# Patient Record
Sex: FEMALE | Race: WHITE
[De-identification: names, ages, dates, MRNs, and addresses within clinical notes are randomized per-mention and may not be internally consistent; named-entity substitution may affect disease eponyms.]

---

## 2018-01-05 ENCOUNTER — HOSPITAL ENCOUNTER (INPATIENT)
Dept: HOSPITAL 54 - ER | Age: 83
LOS: 7 days | Discharge: HOME | DRG: 139 | End: 2018-01-12
Attending: NURSE PRACTITIONER | Admitting: NURSE PRACTITIONER
Payer: COMMERCIAL

## 2018-01-05 VITALS — SYSTOLIC BLOOD PRESSURE: 126 MMHG | DIASTOLIC BLOOD PRESSURE: 60 MMHG

## 2018-01-05 VITALS — WEIGHT: 98 LBS | BODY MASS INDEX: 16.73 KG/M2 | HEIGHT: 64 IN

## 2018-01-05 DIAGNOSIS — F02.80: ICD-10-CM

## 2018-01-05 DIAGNOSIS — L97.529: ICD-10-CM

## 2018-01-05 DIAGNOSIS — D62: ICD-10-CM

## 2018-01-05 DIAGNOSIS — R23.4: ICD-10-CM

## 2018-01-05 DIAGNOSIS — I70.0: ICD-10-CM

## 2018-01-05 DIAGNOSIS — Z96.653: ICD-10-CM

## 2018-01-05 DIAGNOSIS — E11.621: ICD-10-CM

## 2018-01-05 DIAGNOSIS — B96.20: ICD-10-CM

## 2018-01-05 DIAGNOSIS — G93.40: ICD-10-CM

## 2018-01-05 DIAGNOSIS — Z95.1: ICD-10-CM

## 2018-01-05 DIAGNOSIS — D50.9: ICD-10-CM

## 2018-01-05 DIAGNOSIS — Z79.899: ICD-10-CM

## 2018-01-05 DIAGNOSIS — J15.9: Primary | ICD-10-CM

## 2018-01-05 DIAGNOSIS — I25.10: ICD-10-CM

## 2018-01-05 DIAGNOSIS — R13.10: ICD-10-CM

## 2018-01-05 DIAGNOSIS — K59.00: ICD-10-CM

## 2018-01-05 DIAGNOSIS — J06.9: ICD-10-CM

## 2018-01-05 DIAGNOSIS — G30.9: ICD-10-CM

## 2018-01-05 DIAGNOSIS — N39.0: ICD-10-CM

## 2018-01-05 DIAGNOSIS — I10: ICD-10-CM

## 2018-01-05 DIAGNOSIS — F41.9: ICD-10-CM

## 2018-01-05 LAB
ALBUMIN SERPL BCP-MCNC: 2.8 G/DL (ref 3.4–5)
ALP SERPL-CCNC: 66 U/L (ref 46–116)
ALT SERPL W P-5'-P-CCNC: 13 U/L (ref 12–78)
APTT PPP: 24 SEC (ref 23–34)
AST SERPL W P-5'-P-CCNC: 16 U/L (ref 15–37)
BILIRUB DIRECT SERPL-MCNC: 0.1 MG/DL (ref 0–0.2)
BILIRUB SERPL-MCNC: 0.3 MG/DL (ref 0.2–1)
BUN SERPL-MCNC: 46 MG/DL (ref 7–18)
CALCIUM SERPL-MCNC: 8.5 MG/DL (ref 8.5–10.1)
CHLORIDE SERPL-SCNC: 109 MMOL/L (ref 98–107)
CO2 SERPL-SCNC: 29 MMOL/L (ref 21–32)
CREAT SERPL-MCNC: 1.3 MG/DL (ref 0.6–1.3)
EOSINOPHIL NFR BLD MANUAL: 1 % (ref 0–4)
GLUCOSE SERPL-MCNC: 194 MG/DL (ref 74–106)
HCT VFR BLD AUTO: 17 % (ref 33–45)
HCT VFR BLD AUTO: 18 % (ref 33–45)
HCT VFR BLD AUTO: 23 % (ref 33–45)
HGB BLD-MCNC: 5 G/DL (ref 11.5–14.8)
HGB BLD-MCNC: 5.5 G/DL (ref 11.5–14.8)
HGB BLD-MCNC: 6.9 G/DL (ref 11.5–14.8)
INR PPP: 1.15 (ref 0.87–1.13)
LYMPHOCYTES NFR BLD MANUAL: 16 % (ref 16–48)
MCH RBC QN AUTO: 16 PG (ref 26–33)
MCHC RBC AUTO-ENTMCNC: 30 G/DL (ref 31–36)
MCV RBC AUTO: 54 FL (ref 82–100)
MONOCYTES NFR BLD MANUAL: 3 % (ref 0–11)
NEUTS BAND NFR BLD MANUAL: 6 % (ref 0–5)
NEUTS SEG NFR BLD MANUAL: 74 % (ref 42–76)
PLATELET # BLD AUTO: 282 /CMM (ref 150–450)
POTASSIUM SERPL-SCNC: 4.2 MMOL/L (ref 3.5–5.1)
PROT SERPL-MCNC: 7 G/DL (ref 6.4–8.2)
PROTHROMBIN TIME: 12 SECS (ref 9.5–12.7)
RBC # BLD AUTO: 3.07 MIL/UL (ref 4–5.2)
RDW COEFFICIENT OF VARIATION: 17.9 (ref 11.5–15)
SODIUM SERPL-SCNC: 141 MMOL/L (ref 136–145)
TROPONIN I SERPL-MCNC: 0.04 NG/ML (ref 0–0.06)
WBC NRBC COR # BLD AUTO: 2.8 K/UL (ref 4.3–11)

## 2018-01-05 PROCEDURE — A4606 OXYGEN PROBE USED W OXIMETER: HCPCS

## 2018-01-05 PROCEDURE — A6248 HYDROGEL DRSG GEL FILLER: HCPCS

## 2018-01-05 PROCEDURE — Z7610: HCPCS

## 2018-01-05 PROCEDURE — 30233N1 TRANSFUSION OF NONAUTOLOGOUS RED BLOOD CELLS INTO PERIPHERAL VEIN, PERCUTANEOUS APPROACH: ICD-10-PCS

## 2018-01-05 RX ADMIN — LORAZEPAM PRN MG: 2 INJECTION INTRAMUSCULAR; INTRAVENOUS at 21:16

## 2018-01-05 NOTE — NUR
MS RN NOTES

RECEIVED CRITICALLY [facility] RESULT FOR HGB 6.9, DR PEPPER NOTIFIED,NEW ORDER RECEIVED TO 
TRANSFUSE 1 UNIT PRBC.PT AWAKE,NO SOB NOTED,IN BED RESTING COMFORTABLY.ATTENDED ALL NEEDS. 
NOTIFIED LAB. INFORMED CONSENT FOR BLOOD TRANSFUSION OBTAINED OVER THE PHONE WITH 
RESPONSIBLE PARTY, WITNESSED BY 2 LICENSED NURSES

## 2018-01-05 NOTE — NUR
MS RN NOTES

PT VERY RESTLESS,TRYING TO GET OUT OF BED,DR ESCALONA AT THE BEDSIDE, ASSESSED THE PT, NEW 
ORDER RECEIVED FOR ATIVAN 1MG Q12H PRN. NOTED AND CARRIED OUT. MEDICATION ADMINISTERED AS 
ORDERED WILL CONTINUE TO MONITOR ACCORDINGLY

## 2018-01-05 NOTE — NUR
NAE DT NEAR SYNCOPAL EPISODE AND SOB WHILE PATIENT WAS ON HER 'S 
CREMATION SERVICE. PATIENT IS AWAKE HOWEVER CONFUSED. PATIENT SATING WELL ON 
ROOM AIR, AFEBRILE. GOWNED PT AND PLACED ON TELE MONITOR. VSS

## 2018-01-05 NOTE — NUR
MS RN OPENING NOTES

RECEIVED PT AWAKE,CONFUSED, RESTLESS, TRYING TO PULL IV ACCESS, REMOVING CLOTHES,TRYING TO 
GET OUT OF BED. ON ROOM AIR, NO SOB,NO APPARENT DISTRESS NOTED. NO S/SX OF PAIN OR 
DISCOMFORT NOTED AT THIS TIME.FAMILY AT BEDSIDE. KEPT CLEAN AND COMFORTABLE.IV SITE INTACT, 
PATENT.S/P BLOOD TRANSFUSION IN ER, NO A/R NOTED.BED LOCKED IN LOWEST POSITION.WILL CONTINUE 
TO MONITOR ACCORDINGLY.

## 2018-01-06 VITALS — SYSTOLIC BLOOD PRESSURE: 134 MMHG | DIASTOLIC BLOOD PRESSURE: 70 MMHG

## 2018-01-06 VITALS — SYSTOLIC BLOOD PRESSURE: 136 MMHG | DIASTOLIC BLOOD PRESSURE: 76 MMHG

## 2018-01-06 VITALS — DIASTOLIC BLOOD PRESSURE: 50 MMHG | SYSTOLIC BLOOD PRESSURE: 100 MMHG

## 2018-01-06 VITALS — DIASTOLIC BLOOD PRESSURE: 80 MMHG | SYSTOLIC BLOOD PRESSURE: 130 MMHG

## 2018-01-06 VITALS — SYSTOLIC BLOOD PRESSURE: 110 MMHG | DIASTOLIC BLOOD PRESSURE: 58 MMHG

## 2018-01-06 VITALS — DIASTOLIC BLOOD PRESSURE: 74 MMHG | SYSTOLIC BLOOD PRESSURE: 138 MMHG

## 2018-01-06 VITALS — DIASTOLIC BLOOD PRESSURE: 75 MMHG | SYSTOLIC BLOOD PRESSURE: 146 MMHG

## 2018-01-06 VITALS — DIASTOLIC BLOOD PRESSURE: 53 MMHG | SYSTOLIC BLOOD PRESSURE: 112 MMHG

## 2018-01-06 VITALS — DIASTOLIC BLOOD PRESSURE: 78 MMHG | SYSTOLIC BLOOD PRESSURE: 132 MMHG

## 2018-01-06 VITALS — SYSTOLIC BLOOD PRESSURE: 129 MMHG | DIASTOLIC BLOOD PRESSURE: 79 MMHG

## 2018-01-06 VITALS — SYSTOLIC BLOOD PRESSURE: 138 MMHG | DIASTOLIC BLOOD PRESSURE: 63 MMHG

## 2018-01-06 LAB
ALBUMIN SERPL BCP-MCNC: 2.6 G/DL (ref 3.4–5)
ALP SERPL-CCNC: 64 U/L (ref 46–116)
ALT SERPL W P-5'-P-CCNC: 16 U/L (ref 12–78)
AST SERPL W P-5'-P-CCNC: 18 U/L (ref 15–37)
BASOPHILS # BLD AUTO: 0 /CMM (ref 0–0.2)
BASOPHILS NFR BLD AUTO: 0 % (ref 0–2)
BILIRUB SERPL-MCNC: 0.5 MG/DL (ref 0.2–1)
BUN SERPL-MCNC: 30 MG/DL (ref 7–18)
CALCIUM SERPL-MCNC: 8.5 MG/DL (ref 8.5–10.1)
CHLORIDE SERPL-SCNC: 112 MMOL/L (ref 98–107)
CHOLEST SERPL-MCNC: 117 MG/DL (ref ?–200)
CO2 SERPL-SCNC: 26 MMOL/L (ref 21–32)
CREAT SERPL-MCNC: 0.9 MG/DL (ref 0.6–1.3)
EOSINOPHIL # BLD AUTO: 0.2 /CMM (ref 0–0.7)
EOSINOPHIL NFR BLD AUTO: 2.3 % (ref 0–6)
GLUCOSE SERPL-MCNC: 98 MG/DL (ref 74–106)
HCT VFR BLD AUTO: 28 % (ref 33–45)
HCT VFR BLD AUTO: 28 % (ref 33–45)
HDLC SERPL-MCNC: 42 MG/DL (ref 40–60)
HGB BLD-MCNC: 8.8 G/DL (ref 11.5–14.8)
HGB BLD-MCNC: 8.9 G/DL (ref 11.5–14.8)
LDLC SERPL DIRECT ASSAY-MCNC: 63 MG/DL (ref 0–99)
LIPASE SERPL-CCNC: 68 U/L (ref 73–393)
LYMPHOCYTES NFR BLD AUTO: 1 /CMM (ref 0.8–4.8)
LYMPHOCYTES NFR BLD AUTO: 12.6 % (ref 20–44)
MAGNESIUM SERPL-MCNC: 2.1 MG/DL (ref 1.8–2.4)
MCH RBC QN AUTO: 21 PG (ref 26–33)
MCHC RBC AUTO-ENTMCNC: 32 G/DL (ref 31–36)
MCV RBC AUTO: 67 FL (ref 82–100)
MONOCYTES NFR BLD AUTO: 0.3 /CMM (ref 0.1–1.3)
MONOCYTES NFR BLD AUTO: 3.2 % (ref 2–12)
NEUTROPHILS # BLD AUTO: 6.5 /CMM (ref 1.8–8.9)
NEUTROPHILS NFR BLD AUTO: 81.9 % (ref 43–81)
PHOSPHATE SERPL-MCNC: 3.1 MG/DL (ref 2.5–4.9)
PLATELET # BLD AUTO: 241 /CMM (ref 150–450)
POTASSIUM SERPL-SCNC: 4 MMOL/L (ref 3.5–5.1)
PROT SERPL-MCNC: 6.6 G/DL (ref 6.4–8.2)
RBC # BLD AUTO: 4.17 MIL/UL (ref 4–5.2)
RDW COEFFICIENT OF VARIATION: 33.6 (ref 11.5–15)
SODIUM SERPL-SCNC: 146 MMOL/L (ref 136–145)
TRIGL SERPL-MCNC: 69 MG/DL (ref 30–150)
TSH SERPL DL<=0.005 MIU/L-ACNC: 1.15 UIU/ML (ref 0.36–3.74)
WBC NRBC COR # BLD AUTO: 8 K/UL (ref 4.3–11)

## 2018-01-06 RX ADMIN — LORAZEPAM PRN MG: 2 INJECTION INTRAMUSCULAR; INTRAVENOUS at 14:09

## 2018-01-06 RX ADMIN — SODIUM CHLORIDE PRN MLS/HR: 4.5 INJECTION, SOLUTION INTRAVENOUS at 23:00

## 2018-01-06 NOTE — NUR
MS RN NOTES

BLOOD TRANSFUSION ONGOING,NO ADVERSE REACTIONS NOTED,PT AFEBRILE,NO RESPIRATORY DISTRESS, NO 
S/SX OF PAIN OR DISCOMFORT NOTED. AWAKE.MONITORED CLOSELY

## 2018-01-06 NOTE — NUR
MS RN NOTES

BLOOD TRANSFUSION INITIATED, VERIFIED BY 2 LICENSED NURSES. PT AWAKE,ON ROOM AIR, AFEBRILE, 
NO ADVERSE REACTION NOTED. TOLERATED WELL NO SOB NOTED. IV SITE INTACT.KEPT CLEAN AND 
DRY.MONITORED CLOSELY.ATTENDED ALL NEEDS.

## 2018-01-06 NOTE — NUR
RN NOTES

PT IS RESTING IN BED COMFORTABLY. PT ON RA, RESPIRATIONS ARE EVEN AND UNLABORED. IV ON LAC 
INTACT AND RUNNING 1/2 NS @ 40ML/HR. LAXATIVE GIVEN AS ORDERED FOR ACUTE CONSTIPATION. PT 
PROVIDED WITH SKIN CARE, KEPT CLEAN AND DRY AND REPOSITIONED Q2HRS. H/H IS 8.8 & 28, 
CONSISTENT WITH EARLIER LABS. SAFETY MEASURES ARE IN PLACE, CALL LIGHT IS IN REACH. WILL 
ENDORSE TO NIGHT SHIFT RN FOR CONTINUITY OF CARE.

## 2018-01-06 NOTE — NUR
MS RN CLOSING NOTES 

PT IN BED SLEEPING ON ROOM AIR NO SOB, NO APPARENT DISTRESS NOTED.S/P BLOOD TRANSFUSION OF 1 
UNIT PRBC,NO ADVERSE REACTION NOTED, AFEBRILE.NO S/SX OF PAIN OR DISCOMFORT NOTED.KEPT CLEAN 
AND COMFORTABLE.ATTENDED ALL NEEDS.WILL CONTINUE TO MONITOR

## 2018-01-06 NOTE — NUR
RN NOTES



 A/O X 1, PT IN STABLE CONDITION, NO S/S OF DISTRESS. SAFETY MEASURES ARE IN PLACE, CALL 
LIGHT IS IN REACH. WILL CONTINUE TO MONITOR. 

-------------------------------------------------------------------------------

Addendum: 01/06/18 at 2321 by CHRISTIAN CANO RN

-------------------------------------------------------------------------------

IV 1/2 NS AT 40 CC/HR ONGOING

## 2018-01-06 NOTE — NUR
MS RN NOTES

CONTINUE ON BLOOD TRANSFUSION NO ADVERSE REACTION NOTED,ON ROOM AIR,NO SOB,RESPIRATIONS 
EVEN,UNLABORED.NO S/SX OF PAIN OR DISCOMFORT NOTED.AFEBRILE.ATTENDED ALL NEEDS

## 2018-01-07 VITALS — SYSTOLIC BLOOD PRESSURE: 169 MMHG | DIASTOLIC BLOOD PRESSURE: 74 MMHG

## 2018-01-07 VITALS — SYSTOLIC BLOOD PRESSURE: 166 MMHG | DIASTOLIC BLOOD PRESSURE: 87 MMHG

## 2018-01-07 VITALS — SYSTOLIC BLOOD PRESSURE: 123 MMHG | DIASTOLIC BLOOD PRESSURE: 63 MMHG

## 2018-01-07 LAB
BASOPHILS # BLD AUTO: 0 /CMM (ref 0–0.2)
BASOPHILS NFR BLD AUTO: 0 % (ref 0–2)
BUN SERPL-MCNC: 22 MG/DL (ref 7–18)
CALCIUM SERPL-MCNC: 9 MG/DL (ref 8.5–10.1)
CHLORIDE SERPL-SCNC: 107 MMOL/L (ref 98–107)
CO2 SERPL-SCNC: 25 MMOL/L (ref 21–32)
CREAT SERPL-MCNC: 0.8 MG/DL (ref 0.6–1.3)
EOSINOPHIL # BLD AUTO: 0.1 /CMM (ref 0–0.7)
EOSINOPHIL NFR BLD AUTO: 2.9 % (ref 0–6)
EOSINOPHIL NFR BLD MANUAL: 3 % (ref 0–4)
GLUCOSE SERPL-MCNC: 74 MG/DL (ref 74–106)
HCT VFR BLD AUTO: 31 % (ref 33–45)
HGB BLD-MCNC: 9.7 G/DL (ref 11.5–14.8)
LYMPHOCYTES NFR BLD AUTO: 0.6 /CMM (ref 0.8–4.8)
LYMPHOCYTES NFR BLD AUTO: 20.8 % (ref 20–44)
LYMPHOCYTES NFR BLD MANUAL: 16 % (ref 16–48)
MCH RBC QN AUTO: 21 PG (ref 26–33)
MCHC RBC AUTO-ENTMCNC: 32 G/DL (ref 31–36)
MCV RBC AUTO: 67 FL (ref 82–100)
MONOCYTES NFR BLD AUTO: 0.3 /CMM (ref 0.1–1.3)
MONOCYTES NFR BLD AUTO: 10.3 % (ref 2–12)
MONOCYTES NFR BLD MANUAL: 12 % (ref 0–11)
NEUTROPHILS # BLD AUTO: 1.9 /CMM (ref 1.8–8.9)
NEUTROPHILS NFR BLD AUTO: 66 % (ref 43–81)
NEUTS BAND NFR BLD MANUAL: 1 % (ref 0–5)
NEUTS SEG NFR BLD MANUAL: 67 % (ref 42–76)
PLATELET # BLD AUTO: 269 /CMM (ref 150–450)
POTASSIUM SERPL-SCNC: 3.6 MMOL/L (ref 3.5–5.1)
RBC # BLD AUTO: 4.59 MIL/UL (ref 4–5.2)
RDW COEFFICIENT OF VARIATION: 34.3 (ref 11.5–15)
SODIUM SERPL-SCNC: 138 MMOL/L (ref 136–145)
VARIANT LYMPHS NFR BLD MANUAL: 1 % (ref 0–0)
WBC NRBC COR # BLD AUTO: 2.9 K/UL (ref 4.3–11)

## 2018-01-07 RX ADMIN — LORAZEPAM PRN MG: 2 INJECTION INTRAMUSCULAR; INTRAVENOUS at 03:41

## 2018-01-07 RX ADMIN — Medication PRN MG: at 20:41

## 2018-01-07 RX ADMIN — LORAZEPAM PRN MG: 2 INJECTION INTRAMUSCULAR; INTRAVENOUS at 15:28

## 2018-01-07 RX ADMIN — SODIUM CHLORIDE PRN MLS/HR: 4.5 INJECTION, SOLUTION INTRAVENOUS at 15:36

## 2018-01-07 NOTE — NUR
MS RN OPENING NOTES

RECEIVED PATIENT IN STABLE CONDITION IN NO APPARENT DISTRESS. PATIENT IS RESTING IN BED. 
BEDSIDE RAILS ARE UP X 2. BED IS LOCKED AND LOWERED. WILL CONTINUE TO MONITOR.

## 2018-01-07 NOTE — NUR
MS RN CLOSING NOTES



AWAKE IN BED, RESPIRATIONS EVEN AND UNLABORED. NOT IN S/S DISTRESS. TOLERATING ROOM AIR 95% 
STABLE CONDITION. KEPT CLEAN AND DRY AND COMFORTABLE, ALL NURSING CARE RENDERED. NEEDS 
ATTENDED AND ANTICIPATED, FREQUENT VISUAL CHECK DONE FOR SAFETY EVERY 2 HOURS. ON LOW BED AT 
ALL TIMES TO ENSURE SAFETY. SAFE HAZARD FREE ENVIRONMENT PROVIDED. CALL LIGHT WITHIN EASY TO 
REACH. WILL ENDORSE NEXT SHIFT CONTINUITY OF CARE. ASSISTED REPOSITION Q2H. IV NS 40 CC/HR 
ONGOING.

## 2018-01-07 NOTE — NUR
MS RN RESTRAINT ORDER



ORDER FOR RESTRAINT WAS OBTAINED. PT REMOVES IV QSHIFT AND IS COMBATIVE WITH STAFF DURING 
CARE. BILATERAL WRIST RESTRAINTS TO BE APPLIED WILL MONITOR PT PER PROTOCOL.

## 2018-01-07 NOTE — NUR
MS RN CLOSING NOTES

PATIENT IS RESTING IN BED. BEDSIDE RAILS ARE UP X 2. BED IS LOCKED AND LOWERED. WILL ENDORSE 
CARE TO NIGHT SHIFT NURSE FOR GISSELLE.

## 2018-01-07 NOTE — NUR
MS RN INITIAL NOTES 



PT IS IN BED, AWAKE AND ALERT X1. PT IS COMBATIVE AND REPORT WAS GIVEN THAT PT REMOVES IV 
EACH SHIFT. NO SIGNS OF SOB OR DISTRESS. BREATHING EVENLY AND UNLABORED ON RA. BED IS IN LOW 
AND LOCKED POSITION, BED ALARM IS ON. WILL CONTINUE TO MONITOR PT

## 2018-01-08 VITALS — DIASTOLIC BLOOD PRESSURE: 66 MMHG | SYSTOLIC BLOOD PRESSURE: 137 MMHG

## 2018-01-08 VITALS — DIASTOLIC BLOOD PRESSURE: 71 MMHG | SYSTOLIC BLOOD PRESSURE: 135 MMHG

## 2018-01-08 LAB
APPEARANCE UR: (no result)
BASOPHILS # BLD AUTO: 0 /CMM (ref 0–0.2)
BASOPHILS NFR BLD AUTO: 0 % (ref 0–2)
BILIRUB UR QL STRIP: NEGATIVE
BUN SERPL-MCNC: 21 MG/DL (ref 7–18)
CALCIUM SERPL-MCNC: 8.9 MG/DL (ref 8.5–10.1)
CHLORIDE SERPL-SCNC: 107 MMOL/L (ref 98–107)
CO2 SERPL-SCNC: 25 MMOL/L (ref 21–32)
COLOR UR: YELLOW
CREAT SERPL-MCNC: 0.7 MG/DL (ref 0.6–1.3)
EOSINOPHIL # BLD AUTO: 0.1 /CMM (ref 0–0.7)
EOSINOPHIL NFR BLD AUTO: 1.4 % (ref 0–6)
EOSINOPHIL NFR BLD MANUAL: 1 % (ref 0–4)
FERRITIN SERPL-MCNC: 35 NG/ML (ref 8–388)
GLUCOSE SERPL-MCNC: 58 MG/DL (ref 74–106)
GLUCOSE UR STRIP-MCNC: NEGATIVE MG/DL
HCT VFR BLD AUTO: 30 % (ref 33–45)
HGB BLD-MCNC: 9.4 G/DL (ref 11.5–14.8)
HGB UR QL STRIP: NEGATIVE ERY/UL
IRON SERPL-MCNC: 14 UG/DL (ref 50–175)
KETONES UR STRIP-MCNC: NEGATIVE MG/DL
LEUKOCYTE ESTERASE UR QL STRIP: NEGATIVE
LYMPHOCYTES NFR BLD AUTO: 1.1 /CMM (ref 0.8–4.8)
LYMPHOCYTES NFR BLD AUTO: 22.8 % (ref 20–44)
LYMPHOCYTES NFR BLD MANUAL: 21 % (ref 16–48)
MCH RBC QN AUTO: 21 PG (ref 26–33)
MCHC RBC AUTO-ENTMCNC: 31 G/DL (ref 31–36)
MCV RBC AUTO: 66 FL (ref 82–100)
MONOCYTES NFR BLD AUTO: 0.3 /CMM (ref 0.1–1.3)
MONOCYTES NFR BLD AUTO: 6.8 % (ref 2–12)
MONOCYTES NFR BLD MANUAL: 6 % (ref 0–11)
NEUTROPHILS # BLD AUTO: 3.4 /CMM (ref 1.8–8.9)
NEUTROPHILS NFR BLD AUTO: 69 % (ref 43–81)
NEUTS BAND NFR BLD MANUAL: 4 % (ref 0–5)
NEUTS SEG NFR BLD MANUAL: 68 % (ref 42–76)
NITRITE UR QL STRIP: POSITIVE
PH UR STRIP: 5.5 [PH] (ref 5–8)
PLATELET # BLD AUTO: 251 /CMM (ref 150–450)
POTASSIUM SERPL-SCNC: 3.5 MMOL/L (ref 3.5–5.1)
PROT UR QL STRIP: NEGATIVE MG/DL
RBC # BLD AUTO: 4.51 MIL/UL (ref 4–5.2)
RBC #/AREA URNS HPF: (no result) /HPF (ref 0–2)
RDW COEFFICIENT OF VARIATION: 33.8 (ref 11.5–15)
SODIUM SERPL-SCNC: 135 MMOL/L (ref 136–145)
TIBC SERPL-MCNC: 351 UG/DL (ref 250–450)
UROBILINOGEN UR STRIP-MCNC: 0.2 EU/DL
WBC #/AREA URNS HPF: (no result) /HPF (ref 0–3)
WBC NRBC COR # BLD AUTO: 4.9 K/UL (ref 4.3–11)

## 2018-01-08 RX ADMIN — DEXTROSE AND SODIUM CHLORIDE PRN MLS/HR: 5; 450 INJECTION, SOLUTION INTRAVENOUS at 14:45

## 2018-01-08 RX ADMIN — Medication PRN MG: at 11:12

## 2018-01-08 NOTE — NUR
MS RN CLOSING NOTE



PT IS IN BED SLEEPING, EASILY AROUSED. NO SIGNS OF SOB OR DISTRESS, BREATHING EVENLY AND 
UNLABORED ON RA. BILATERAL SOFT WRIST RESTRAINTS ORDERED, THE LEFT ONE IS ON AND THE RIGHT 
HAS BEEN RELEASED. IV FLUIDS RUNNING AT 40 ML/HR. EGD IS CANCELLED FOR TODAY. PENDING 
SWALLOW EVAL. BED IS IN LOW AND LOCKED POSITION, BED ALARM IS ON. WILL ENDORSE TO DAYSHIFT.

## 2018-01-08 NOTE — NUR
RN NOTES 





 PATIENT RESTING IN BED. NONLABORED BREATHING NOTED. PATIENT AOX1 CONFUSED, IV SITE RIGHT 
UPPER ARM PATENT ORDERED FLUIDS. NO FACIAL GRIMACING NOTED. PATIENT ON SOFT BILATERAL 
RESTRAINTS PER ORDERS. PATIENT TURNED AND REPOSITIONED EVERY 2 HOURS. PROVIDED WITH 
TOILETING, RELEASE OF RESTRAINTS. PATIENT REORIENTED FREQUENTLY, NEEDS REINFORCEEMENT.



ALICIA BARRETT, NP, ORDERED  A URINARY ANALYSIS AND CULTURE AND SPECIMEN TO BE OBTAINED VIA 
STRAIGHT CATHETER. SPECIMEN OBTAINED AND PLACED IN FRIDGE, LAB CONTACTED FOR  

HE ALSO ORDERED D5 1/2 NS AT 40 ML/HOUR



SALLOW EVAL STILL PENDING. 



AT 0738, BLOOD SUGAR ASSESSED AND NOTED TO BE 62 . [facility] CONTACTED AND D50 50 ML ADMINISTERED 
PER ALICIA BARRETT'S ORDERS. BLOOD SUGAR IMPROVEMENT NOTED.



ENDORSED TO NEXT SHIFT

## 2018-01-08 NOTE — NUR
MS RN INITIAL NOTES 



PT IS IN BED, SLEEPING EASILY AROUSED, CONFUSED AND COMBATIVE. PT IS ON BILATERAL SOFT WRIST 
RESTRAINTS. NO SIGNS OF SOB OR DISTRESS. BREATHING EVENLY AND UNLABORED ON RA. ON IV D5 1/2 
NS RUNNING AT 40 ML/HR. BED IS IN LOW AND LOCKED POSITION, BED ALARM IS ON. WILL CONTINUE TO 
MONITOR PT

## 2018-01-08 NOTE — NUR
RN NOTES:



 PATIENT RESTING IN BED. NONLABORED BREATHING NOTED. PATIENT AOX1 CONFUSED, IV SITE RIGHT 
UPPER ARM PATENT WITH 1/2 NS RUNNING PER ORDERS. NO FACIAL GRIMACING NOTED. PATIENT ON SOFT 
BILATERAL RESTRAINTS PER ORDERS. BED IN LOWEST LOCKED POSITION. CALL LIGHT WITHIN REACH. 
WILL CONTINUE TO MONITOR PATIENT

## 2018-01-09 VITALS — DIASTOLIC BLOOD PRESSURE: 55 MMHG | SYSTOLIC BLOOD PRESSURE: 129 MMHG

## 2018-01-09 VITALS — DIASTOLIC BLOOD PRESSURE: 71 MMHG | SYSTOLIC BLOOD PRESSURE: 136 MMHG

## 2018-01-09 VITALS — DIASTOLIC BLOOD PRESSURE: 90 MMHG | SYSTOLIC BLOOD PRESSURE: 151 MMHG

## 2018-01-09 RX ADMIN — LORAZEPAM PRN MG: 2 INJECTION INTRAMUSCULAR; INTRAVENOUS at 15:44

## 2018-01-09 RX ADMIN — ALBUTEROL SULFATE SCH MG: 2.5 SOLUTION RESPIRATORY (INHALATION) at 19:50

## 2018-01-09 RX ADMIN — Medication SCH MG: at 19:50

## 2018-01-09 RX ADMIN — ALBUTEROL SULFATE SCH MG: 2.5 SOLUTION RESPIRATORY (INHALATION) at 11:49

## 2018-01-09 RX ADMIN — DEXTROSE MONOHYDRATE SCH MLS/HR: 50 INJECTION, SOLUTION INTRAVENOUS at 12:37

## 2018-01-09 RX ADMIN — ALBUTEROL SULFATE SCH MG: 2.5 SOLUTION RESPIRATORY (INHALATION) at 15:30

## 2018-01-09 RX ADMIN — Medication SCH MG: at 11:49

## 2018-01-09 RX ADMIN — ACETYLCYSTEINE SCH MG: 100 INHALANT RESPIRATORY (INHALATION) at 23:10

## 2018-01-09 RX ADMIN — DEXTROSE AND SODIUM CHLORIDE PRN MLS/HR: 5; 450 INJECTION, SOLUTION INTRAVENOUS at 20:50

## 2018-01-09 RX ADMIN — Medication PRN MG: at 14:42

## 2018-01-09 RX ADMIN — Medication SCH MG: at 15:30

## 2018-01-09 RX ADMIN — SODIUM CHLORIDE SCH MLS/HR: 9 INJECTION, SOLUTION INTRAVENOUS at 16:24

## 2018-01-09 RX ADMIN — DEXTROSE MONOHYDRATE SCH MLS/HR: 50 INJECTION, SOLUTION INTRAVENOUS at 13:48

## 2018-01-09 RX ADMIN — ACETYLCYSTEINE SCH MG: 100 INHALANT RESPIRATORY (INHALATION) at 11:57

## 2018-01-09 NOTE — NUR
PATIENT REFUSED TX AT THIS TIME. EXPLAINED RISKS AND BENEFITS. NO SOB NOTED. NURSE, CHANA, 
NOTIFIED.

## 2018-01-09 NOTE — NUR
RN OPENING NOTES

PT RESTING IN BED. PT CONFUSED. NO APPARENT S/S OF PAIN OR DISTRESS NOTED AT THIS TIME. PT 
HAS A RIGHT UPPER ARM IV #22 RUNNING D5 1/2 @40ML/HR, AND LEFT FOREARM #20 IV, INTACT AND 
PATENT. SAFETY PRECAUTIONS IN PLACE. BED IN LOW, LOCKED POSITION, X2 SIDERAILS UP, CALL 
LIGHT WITHIN REACH. WILL CONTINUE TO MONITOR.

## 2018-01-09 NOTE — NUR
JULISA EATON NP IN AND MULTIPLE ORDERS GIVEN.FAMILY IN TO VISIT X2. PT. MEDICATED WITH 
MORPHINE AND ATIVAN.

## 2018-01-09 NOTE — NUR
MS RN CLOSING NTOES 



PT IS IN BED SLEEPING, AROUSES TO TOUCH. NO SIGNS OF SOB OR DISTRESS, BREATHING EVENLY AND 
UNLABORED ON RA. RIGHT WRIST RESTRAINT IS RELEASED. ALL NEEDS WERE ANTICIPATED AND MET. BED 
IS IN LOW AND LOCKED POSITION, BED ALARM IS ON. WILL ENDORSE TO DAYSHIFT

## 2018-01-09 NOTE — NUR
RT

PATIENT REFUSES SUCTION AFTER EXPLAINING RISKS AND BENEFITS. PATIENT CURRENTLY ON ROOM AIR 
WITH SP02 @ 96%. NO DISTRESS NOTED AT THIS TIME.

## 2018-01-10 VITALS — SYSTOLIC BLOOD PRESSURE: 130 MMHG | DIASTOLIC BLOOD PRESSURE: 68 MMHG

## 2018-01-10 VITALS — DIASTOLIC BLOOD PRESSURE: 54 MMHG | SYSTOLIC BLOOD PRESSURE: 117 MMHG

## 2018-01-10 VITALS — SYSTOLIC BLOOD PRESSURE: 149 MMHG | DIASTOLIC BLOOD PRESSURE: 72 MMHG

## 2018-01-10 LAB
BASOPHILS # BLD AUTO: 0 /CMM (ref 0–0.2)
BASOPHILS NFR BLD AUTO: 0 % (ref 0–2)
BUN SERPL-MCNC: 14 MG/DL (ref 7–18)
CALCIUM SERPL-MCNC: 8.8 MG/DL (ref 8.5–10.1)
CHLORIDE SERPL-SCNC: 107 MMOL/L (ref 98–107)
CO2 SERPL-SCNC: 28 MMOL/L (ref 21–32)
CREAT SERPL-MCNC: 0.9 MG/DL (ref 0.6–1.3)
EOSINOPHIL # BLD AUTO: 0 /CMM (ref 0–0.7)
EOSINOPHIL NFR BLD AUTO: 0.9 % (ref 0–6)
GLUCOSE SERPL-MCNC: 88 MG/DL (ref 74–106)
HCT VFR BLD AUTO: 28 % (ref 33–45)
HGB BLD-MCNC: 8.7 G/DL (ref 11.5–14.8)
LYMPHOCYTES NFR BLD AUTO: 1 /CMM (ref 0.8–4.8)
LYMPHOCYTES NFR BLD AUTO: 22.9 % (ref 20–44)
LYMPHOCYTES NFR BLD MANUAL: 26 % (ref 16–48)
MAGNESIUM SERPL-MCNC: 1.7 MG/DL (ref 1.8–2.4)
MCH RBC QN AUTO: 21 PG (ref 26–33)
MCHC RBC AUTO-ENTMCNC: 32 G/DL (ref 31–36)
MCV RBC AUTO: 67 FL (ref 82–100)
MONOCYTES NFR BLD AUTO: 0.5 /CMM (ref 0.1–1.3)
MONOCYTES NFR BLD AUTO: 10.5 % (ref 2–12)
MONOCYTES NFR BLD MANUAL: 2 % (ref 0–11)
NEUTROPHILS # BLD AUTO: 2.8 /CMM (ref 1.8–8.9)
NEUTROPHILS NFR BLD AUTO: 65.7 % (ref 43–81)
NEUTS BAND NFR BLD MANUAL: 3 % (ref 0–5)
NEUTS SEG NFR BLD MANUAL: 69 % (ref 42–76)
PHOSPHATE SERPL-MCNC: 3.6 MG/DL (ref 2.5–4.9)
PLATELET # BLD AUTO: 217 /CMM (ref 150–450)
POTASSIUM SERPL-SCNC: 3.4 MMOL/L (ref 3.5–5.1)
RBC # BLD AUTO: 4.17 MIL/UL (ref 4–5.2)
RDW COEFFICIENT OF VARIATION: 34.4 (ref 11.5–15)
SODIUM SERPL-SCNC: 142 MMOL/L (ref 136–145)
WBC NRBC COR # BLD AUTO: 4.3 K/UL (ref 4.3–11)

## 2018-01-10 RX ADMIN — ACETYLCYSTEINE SCH MG: 100 INHALANT RESPIRATORY (INHALATION) at 09:20

## 2018-01-10 RX ADMIN — Medication PRN GM: at 12:14

## 2018-01-10 RX ADMIN — ALBUTEROL SULFATE SCH MG: 2.5 SOLUTION RESPIRATORY (INHALATION) at 09:20

## 2018-01-10 RX ADMIN — SODIUM CHLORIDE SCH MLS/HR: 9 INJECTION, SOLUTION INTRAVENOUS at 17:05

## 2018-01-10 RX ADMIN — DEXTROSE MONOHYDRATE SCH MLS/HR: 50 INJECTION, SOLUTION INTRAVENOUS at 12:14

## 2018-01-10 RX ADMIN — Medication SCH MG: at 19:44

## 2018-01-10 RX ADMIN — POTASSIUM CHLORIDE SCH MLS/HR: 200 INJECTION, SOLUTION INTRAVENOUS at 19:10

## 2018-01-10 RX ADMIN — Medication SCH MG: at 11:38

## 2018-01-10 RX ADMIN — ACETYLCYSTEINE SCH MG: 100 INHALANT RESPIRATORY (INHALATION) at 16:02

## 2018-01-10 RX ADMIN — ALBUTEROL SULFATE SCH MG: 2.5 SOLUTION RESPIRATORY (INHALATION) at 19:44

## 2018-01-10 RX ADMIN — ALBUTEROL SULFATE SCH MG: 2.5 SOLUTION RESPIRATORY (INHALATION) at 16:02

## 2018-01-10 RX ADMIN — MAGNESIUM SULFATE IN DEXTROSE SCH MLS/HR: 10 INJECTION, SOLUTION INTRAVENOUS at 14:16

## 2018-01-10 RX ADMIN — Medication SCH MG: at 16:02

## 2018-01-10 RX ADMIN — ALBUTEROL SULFATE SCH MG: 2.5 SOLUTION RESPIRATORY (INHALATION) at 11:38

## 2018-01-10 RX ADMIN — Medication SCH GM: at 12:16

## 2018-01-10 RX ADMIN — POTASSIUM CHLORIDE SCH MLS/HR: 200 INJECTION, SOLUTION INTRAVENOUS at 11:35

## 2018-01-10 RX ADMIN — DEXTROSE MONOHYDRATE SCH MLS/HR: 50 INJECTION, SOLUTION INTRAVENOUS at 12:12

## 2018-01-10 RX ADMIN — Medication SCH MG: at 09:20

## 2018-01-10 RX ADMIN — Medication PRN GM: at 12:15

## 2018-01-10 RX ADMIN — ACETYLCYSTEINE SCH MG: 100 INHALANT RESPIRATORY (INHALATION) at 23:36

## 2018-01-10 NOTE — NUR
MS/RN  CLOSING NOTE



PATIENT IN BED AWAKE. A/O X1. RESPIRATION REGULAR AND UNLABORED. IN NO APPARENT DISTRESS.  
SOFT RESTRAIN ON PER ORDER. SKIN ASSESSED. BED LOW AND LOCKED. BED ALARM ON. SIDE RAIL UP 
X2. CALL LIGHT WITHIN REACH. WILL ENDORSE TO NIGH SHIFT.

## 2018-01-10 NOTE — NUR
MS RN NOTES

RECEIVED ON BED,CONFUSED,ON BILATERAL WRIST RESTRAINTS,TRYING TO PULL OUT IV TUBINGS,NPO 
STATUS,GOING FOR EGD TOMORROW.CONSENT ON CHART.FALL PRECAUTION OBSERVED.CALL LIGHT IN 
REACH,WILL CONTINUE TO MONITOR STATUS.

## 2018-01-10 NOTE — NUR
WOUND CARE CONSULT: PT PRESENTS WITH LEFT FOOT ULCER, PRESENT ON ADMISSION. RT HEEL CALLUS 
NOTED WITH FEW SCRATCHES, PRESENT ON ADMISSION.  SACRAL SCARRING AND RT LEG DRY SCABS NOTED, 
ALL PRESENT ON ADMISSION. PT IS EXTREMELY THIN AND BONY. PT IS INCONTINENT. ALL SKIN 
PROTECTION AND WOUND RECOMMENDATIONS DISCUSSED WITH NURSING STAFF. DPM CONSULT RECOMMENDED. 
PT TO BE PLACED ON ISOFLEX LOW AIRLOSS BED. CURRENT NICK SCORE IS 13. WILL SEE DONALDO BETANCOURT IN 
AGREEMENT WITH PLAN OF CARE. 

-------------------------------------------------------------------------------

Addendum: 01/10/18 at 0919 by RANJIT VELAZCO WNDNU

-------------------------------------------------------------------------------

Amended: Links added.

## 2018-01-10 NOTE — NUR
MS/RN   S/B Dr Hdz



Seen by Dr Hdz - sedating medications to be limited at this time due to increased 
confusion. All other care per primary care team.

## 2018-01-10 NOTE — NUR
MS/RN   S/B Юлия Pacheco NP



Seen by NP - patient to remain NPO at this time as very high risk of aspiration. Family at 
bedside and aware. Morning labs ordered.

## 2018-01-10 NOTE — NUR
MS/RN  OPENING NOTE



RECEIVED PATIENT IN BED SLEEPING. RESPONSIVE TO VERNAL AND TACTILE STIMULI. PATIENT ALERT 
CONFUSED. REDIRECTION PROVIDED. RESPIRATION REGULAR AND UNLABORED. DENIES SOB,PAIN. IN NO 
APPARENT DISTRESS. RYANN G22 PATENT AND IV INFUSING WITH NO S/SX INFILTRATION.  LFA G20 PATENT 
AND  SALINE LOCK. BED LOW AND LOCKED. BED ALARM ON. CALL LIGHT WITHIN REACH. WILL CONTINUE 
TO MONITOR.

## 2018-01-10 NOTE — NUR
MS/RN  MED NOT DELIVERED



POTASSIUM CHLORIDE MEDICATION STILL NOT DELIVERED, FOLLOW UP CALLS MADE. WILL CONTINUE TO 
FOLLOW UP.

## 2018-01-10 NOTE — NUR
MS/RN  MED NOT DELIVERED



POTASSIUM CHLORIDE MEDICATION STILL NOT DELIVERED, FOLLOW UP CALLS MADE. WILL CONTINUE TO 
FOLLOW UP

## 2018-01-10 NOTE — NUR
RN OPENING NOTES

PT RESTING IN BED. NO CHANGES OVERNIGHT. PT CONFUSED. NO APPARENT S/S OF PAIN OR DISTRESS 
NOTED AT THIS TIME. PT HAS A RIGHT UPPER ARM IV #22 RUNNING D5 1/2 @40ML/HR, AND LEFT 
FOREARM #20 IV, INTACT AND PATENT. SAFETY PRECAUTIONS IN PLACE. BED IN LOW, LOCKED POSITION, 
X2 SIDERAILS UP, CALL LIGHT WITHIN REACH. WILL ENDORSE TO DAY SHIFT NURSE FOR CONTINUITY OF 
CARE.

## 2018-01-11 VITALS — DIASTOLIC BLOOD PRESSURE: 67 MMHG | SYSTOLIC BLOOD PRESSURE: 146 MMHG

## 2018-01-11 VITALS — DIASTOLIC BLOOD PRESSURE: 82 MMHG | SYSTOLIC BLOOD PRESSURE: 157 MMHG

## 2018-01-11 VITALS — SYSTOLIC BLOOD PRESSURE: 142 MMHG | DIASTOLIC BLOOD PRESSURE: 70 MMHG

## 2018-01-11 LAB
APTT PPP: 35 SEC (ref 23–34)
BUN SERPL-MCNC: 9 MG/DL (ref 7–18)
CALCIUM SERPL-MCNC: 9.5 MG/DL (ref 8.5–10.1)
CHLORIDE SERPL-SCNC: 104 MMOL/L (ref 98–107)
CO2 SERPL-SCNC: 28 MMOL/L (ref 21–32)
CREAT SERPL-MCNC: 0.8 MG/DL (ref 0.6–1.3)
GLUCOSE SERPL-MCNC: 78 MG/DL (ref 74–106)
HCT VFR BLD AUTO: 33 % (ref 33–45)
HGB BLD-MCNC: 10.6 G/DL (ref 11.5–14.8)
INR PPP: 1.11 (ref 0.87–1.13)
LYMPHOCYTES NFR BLD MANUAL: 24 % (ref 16–48)
MAGNESIUM SERPL-MCNC: 2.1 MG/DL (ref 1.8–2.4)
MCH RBC QN AUTO: 21 PG (ref 26–33)
MCHC RBC AUTO-ENTMCNC: 32 G/DL (ref 31–36)
MCV RBC AUTO: 66 FL (ref 82–100)
MONOCYTES NFR BLD MANUAL: 10 % (ref 0–11)
NEUTS BAND NFR BLD MANUAL: 4 % (ref 0–5)
NEUTS SEG NFR BLD MANUAL: 62 % (ref 42–76)
PLATELET # BLD AUTO: 280 /CMM (ref 150–450)
POTASSIUM SERPL-SCNC: 3.5 MMOL/L (ref 3.5–5.1)
PROTHROMBIN TIME: 11.6 SECS (ref 9.5–12.7)
RBC # BLD AUTO: 5.07 MIL/UL (ref 4–5.2)
RDW COEFFICIENT OF VARIATION: 34.3 (ref 11.5–15)
SODIUM SERPL-SCNC: 141 MMOL/L (ref 136–145)
WBC NRBC COR # BLD AUTO: 3.3 K/UL (ref 4.3–11)

## 2018-01-11 RX ADMIN — ALBUTEROL SULFATE SCH MG: 2.5 SOLUTION RESPIRATORY (INHALATION) at 07:42

## 2018-01-11 RX ADMIN — Medication SCH MG: at 07:42

## 2018-01-11 RX ADMIN — ALBUTEROL SULFATE SCH MG: 2.5 SOLUTION RESPIRATORY (INHALATION) at 11:36

## 2018-01-11 RX ADMIN — Medication SCH GM: at 09:00

## 2018-01-11 RX ADMIN — ALBUTEROL SULFATE SCH MG: 2.5 SOLUTION RESPIRATORY (INHALATION) at 15:46

## 2018-01-11 RX ADMIN — LORAZEPAM PRN MG: 2 INJECTION INTRAMUSCULAR; INTRAVENOUS at 12:27

## 2018-01-11 RX ADMIN — SODIUM CHLORIDE SCH MLS/HR: 9 INJECTION, SOLUTION INTRAVENOUS at 15:56

## 2018-01-11 RX ADMIN — Medication SCH MG: at 19:57

## 2018-01-11 RX ADMIN — ACETYLCYSTEINE SCH MG: 100 INHALANT RESPIRATORY (INHALATION) at 15:30

## 2018-01-11 RX ADMIN — Medication SCH MG: at 15:46

## 2018-01-11 RX ADMIN — ACETYLCYSTEINE SCH MG: 100 INHALANT RESPIRATORY (INHALATION) at 07:35

## 2018-01-11 RX ADMIN — DEXTROSE AND SODIUM CHLORIDE PRN MLS/HR: 5; 450 INJECTION, SOLUTION INTRAVENOUS at 12:25

## 2018-01-11 RX ADMIN — ACETYLCYSTEINE SCH MG: 100 INHALANT RESPIRATORY (INHALATION) at 23:38

## 2018-01-11 RX ADMIN — DEXTROSE MONOHYDRATE SCH MLS/HR: 50 INJECTION, SOLUTION INTRAVENOUS at 14:22

## 2018-01-11 RX ADMIN — ALBUTEROL SULFATE SCH MG: 2.5 SOLUTION RESPIRATORY (INHALATION) at 19:57

## 2018-01-11 RX ADMIN — DEXTROSE MONOHYDRATE SCH MLS/HR: 50 INJECTION, SOLUTION INTRAVENOUS at 12:17

## 2018-01-11 RX ADMIN — Medication SCH MG: at 11:36

## 2018-01-11 NOTE — NUR
MS RN NOTES

ON BED AWAKE,TALKING IN Haitian,IVF INFUSING WELL VIA RIGHT ARM SALINE LOCK VIA IVP PUMP.ON 
BILATERAL SOFT WRIST RESTRAINTS.FALL PRECAUTION OBSERVED.WILL CONTINUE TO MONITOR STATUS.

## 2018-01-11 NOTE — NUR
RN CLOSING NOTES



PATIENT IN BED RESTING. NO ACUTE DISTRESS, NO SOB NOTED. NO S/S OF PAIN OR DISCOMFORT. SOFT 
WRIST RESTRAIN IN PLACE, CHECKED CIRCULATION AND ASSESSED SKIN, NO COMPLICATIONS NOTED. ALL 
NEEDS ATTENDED AND PROVIDED. BED LOW AND LOCKED. BED ALARM ON. SIDE RAIL UP X2. CALL LIGHT 
WITHIN REACH. ENDORSED TO [facility] HO RN FOR GISSELLE.

## 2018-01-11 NOTE — NUR
RN OPENING NOTES



RECEIVED PATIENT IN BED SLEEPING. RESPONSIVE TO VERBAL AND TACTILE STIMULI. PATIENT 
CONFUSED. REDIRECTION PROVIDED. NO ACUTE DISTRESS NO SOB, NO S/S OF PAIN OR DISCOMFORT. IV 
SITE INTACT AND PATENT. BILATERAL SOFT RESTRAINTS ON UPPER EXTREMITIES IN PLACE, WILL CHECK 
EVERY 2 HOURS AS NEEDED. BED LOW AND LOCKED. BED ALARM ON. CALL LIGHT WITHIN REACH. WILL 
CONTINUE TO MONITOR ACCORDINGLY.

## 2018-01-11 NOTE — NUR
MS RN NOTES

NO SIGNIFICANT CHANGE IN STATUS,KEPT NPO FOR ASPIRATION PRECAUTION,KEPT WARM AND 
COMFORTABLE,IVF INFUSING NEEDS ATTENDED.WILL ENDORSE TO DAY NURSE FOR GISSELLE.

## 2018-01-12 VITALS — SYSTOLIC BLOOD PRESSURE: 165 MMHG | DIASTOLIC BLOOD PRESSURE: 69 MMHG

## 2018-01-12 VITALS — SYSTOLIC BLOOD PRESSURE: 140 MMHG | DIASTOLIC BLOOD PRESSURE: 84 MMHG

## 2018-01-12 RX ADMIN — ACETYLCYSTEINE SCH MG: 100 INHALANT RESPIRATORY (INHALATION) at 08:06

## 2018-01-12 RX ADMIN — Medication SCH GM: at 09:28

## 2018-01-12 RX ADMIN — Medication SCH MG: at 12:23

## 2018-01-12 RX ADMIN — DEXTROSE MONOHYDRATE SCH MLS/HR: 50 INJECTION, SOLUTION INTRAVENOUS at 11:22

## 2018-01-12 RX ADMIN — ALBUTEROL SULFATE SCH MG: 2.5 SOLUTION RESPIRATORY (INHALATION) at 08:06

## 2018-01-12 RX ADMIN — Medication SCH MG: at 08:06

## 2018-01-12 RX ADMIN — ALBUTEROL SULFATE SCH MG: 2.5 SOLUTION RESPIRATORY (INHALATION) at 12:23

## 2018-01-12 NOTE — NUR
MS RN NOTES

NO SIGNIFICANT CHANGE IN STATUS,SLEPT WELL,IVF INFUSING,SITE REMAINS PATENT ON RIGHT FORE 
ARM,RESTRAINTS IN PLACE.IN NO ACUTE DISTRESS.WILL ENDORSE TO DAY NURSE FOR GISSELLE.

## 2018-01-12 NOTE — NUR
PT RECEIVED



RESTING COMFORTABLY IN BED. NO S/S OR C/O PAIN OR DISTRESS NOTED. SIDE RAILS UP X2, CALL 
LIGHT LEFT WITHIN REACH. WILL CONTINUE PLAN OF CARE.

## 2018-01-12 NOTE — NUR
DISCHARGE



INSTRUCTIONS GIVEN AS ORDERED. ENCOURAGED TO FOLLOW UP WITH PMD AS INSTRUCTED. ALL QUESTIONS 
AND CONCERNS ADDRESSED. PATIENT'S SON VERBALIZED UNDERSTANDING. MEDICATION RECONCILIATION 
FORM COMPLETED. COPY GIVEN TO PATIENT'S SON. IV REMOVED WITH CATHETER INTACT. PRESSURE 
DRESSING APPLIED. PATIENT TAKEN TO VEHICLE WITH ALL PERSONAL BELONGINGS, ACCOMPANIED BY 
STAFF AND FAMILY MEMBER. NO DISTRESS NOTED AT TIME OF DEPARTURE.